# Patient Record
Sex: FEMALE | Race: WHITE | Employment: FULL TIME | URBAN - METROPOLITAN AREA
[De-identification: names, ages, dates, MRNs, and addresses within clinical notes are randomized per-mention and may not be internally consistent; named-entity substitution may affect disease eponyms.]

---

## 2020-05-11 ENCOUNTER — VIRTUAL VISIT (OUTPATIENT)
Dept: FAMILY MEDICINE CLINIC | Age: 25
End: 2020-05-11

## 2020-05-11 DIAGNOSIS — Z3A.22 22 WEEKS GESTATION OF PREGNANCY: Primary | ICD-10-CM

## 2020-05-11 RX ORDER — ASPIRIN 81 MG/1
TABLET ORAL DAILY
COMMUNITY
End: 2021-09-07

## 2020-05-11 RX ORDER — ASCORBIC ACID 500 MG
TABLET ORAL DAILY
COMMUNITY
End: 2021-09-07

## 2020-05-11 NOTE — PATIENT INSTRUCTIONS
Weeks 22 to 26 of Your Pregnancy: Care Instructions Your Care Instructions As you enter your 7th month of pregnancy at week 26, your baby's lungs are growing stronger and getting ready to breathe. You may notice that your baby responds to the sound of your or your partner's voice. You may also notice that your baby does less turning and twisting and more squirming or jerking. Jerking often means that your baby has the hiccups. Hiccups are perfectly normal and are only temporary. You may want to think about attending a childbirth preparation class. This is also a good time to start thinking about whether you want to have pain medicine during labor. Most pregnant women are tested for gestational diabetes between weeks 25 and 28. Gestational diabetes occurs when your blood sugar level gets too high when you're pregnant. The test is important, because you can have gestational diabetes and not know it. But the condition can cause problems for your baby. Follow-up care is a key part of your treatment and safety. Be sure to make and go to all appointments, and call your doctor if you are having problems. It's also a good idea to know your test results and keep a list of the medicines you take. How can you care for yourself at home? Ease discomfort from your baby's kicking · Change your position. Sometimes this will cause your baby to change position too. · Take a deep breath while you raise your arm over your head. Then breathe out while you drop your arm. Do Kegel exercises to prevent urine from leaking · You can do Kegel exercises while you stand or sit. ? Squeeze the same muscles you would use to stop your urine. Your belly and thighs should not move. ? Hold the squeeze for 3 seconds, and then relax for 3 seconds. ? Start with 3 seconds. Then add 1 second each week until you are able to squeeze for 10 seconds. ? Repeat the exercise 10 to 15 times for each session.  Do three or more sessions each day. Ease or reduce swelling in your feet, ankles, hands, and fingers · If your fingers are puffy, take off your rings. · Do not eat high-salt foods, such as potato chips. · Prop up your feet on a stool or couch as much as possible. Sleep with pillows under your feet. · Do not stand for long periods of time or wear tight shoes. · Wear support stockings. Where can you learn more? Go to http://marcial-evgeny.info/ Enter G264 in the search box to learn more about \"Weeks 22 to 26 of Your Pregnancy: Care Instructions. \" Current as of: May 29, 2019Content Version: 12.4 © 4635-6046 [employer]. Care instructions adapted under license by [employer] (which disclaims liability or warranty for this information). If you have questions about a medical condition or this instruction, always ask your healthcare professional. [address] any warranty or liability for your use of this information.

## 2020-05-11 NOTE — PROGRESS NOTES
Alisa Mendieta is a 25 y.o. female who was evaluated by an audio-video encounter for concerns as above. Patient identification was verified prior to start of the visit. A caregiver was present when appropriate. Due to this being a TeleHealth encounter (During [facility] public health emergency), evaluation of the following organ systems was limited: Vitals/Constitutional/EENT/Resp/CV/GI//MS/Neuro/Skin/Heme-Lymph-Imm. Pursuant to the emergency declaration under the [facility], [facility] waiver authority and the [employer] Act, this Virtual Visit was conducted, with patient's (and/or legal guardian's) consent, to reduce the patient's risk of exposure to COVID-19 and provide necessary medical care. Services were provided through a synchronous discussion virtually to substitute for in-person clinic visit. I was at home. The patient was at home. SUBJECTIVE  Chief Complaint   Patient presents with    Other     establish care, 22 weeks pregnant     Patient presents for a referral to ob. She just moved back to the area from [place]. [facility] advised her that they do not have any capacity to see her for her pregnancy. She is 22 weeks pregnant with her first child. She has had prenatal care to date. She is on bASA due to risk of pre-eclampsia she says. She is on a PNV and vit C. She has no complaints. Past Medical History:   Diagnosis Date    Sleep apnea     improved after tonsils out     Past Surgical History:   Procedure Laterality Date    HX GYN  2009    cyst removed with partial fallopian tube.  HX HEENT      ear tubes    HX OTHER SURGICAL      FRENULECTOMY    HX TONSIL AND ADENOIDECTOMY  2016       Current Outpatient Medications:     ascorbic acid, vitamin C, (Vitamin C) 500 mg tablet, Take  by mouth daily. , Disp: , Rfl:     prenatal vit,calc76/iron/folic (PNV 09-6 PO), Take  by mouth daily. , Disp: , Rfl:    aspirin delayed-release 81 mg tablet, Take  by mouth daily. , Disp: , Rfl:   No Known Allergies    Social History     Tobacco Use    Smoking status: Never Smoker    Smokeless tobacco: Never Used   Substance Use Topics    Alcohol use: No    Drug use: No     Family History   Problem Relation Age of Onset    Hypertension Mother     Cancer Mother         skin    Cancer Paternal Aunt         lung cancer    Cancer Maternal Grandmother         skin cancer    Hypertension Maternal Grandmother     Heart Disease Maternal Grandfather     Cancer Paternal Grandmother         breast cancer    Cancer Paternal Grandfather         prostate cancer    Hypertension Father     Diabetes Father      ROS:  Complete 10 system ROS is obtained from the patient which is negative. OBJECTIVE    General:  Alert, cooperative, well appearing, in no apparent distress. Psych: normal affect. Mood good. Oriented x 3. Judgement and insight intact. ASSESSMENT / PLAN    ICD-10-CM ICD-9-CM    1. 22 weeks gestation of pregnancy Z3A.22 V22.2 REFERRAL TO OBSTETRICS AND GYNECOLOGY     Refer to OB. Cont current care. Cont PNV. Advised to provide a vaccine record for our review. 22 minutes of virtual face to face time spent with the patient. All chart history elements were reviewed by me at the time of the visit even though marked at time of note closure. Patient understands our medical plan. Patient has provided input and agrees with goals. Alternatives have been explained and offered. All questions answered. The patient is to call if condition worsens or fails to improve. RTC as needed.

## 2020-08-06 ENCOUNTER — OFFICE VISIT (OUTPATIENT)
Dept: FAMILY MEDICINE CLINIC | Facility: CLINIC | Age: 25
End: 2020-08-06

## 2020-08-06 ENCOUNTER — NURSE TRIAGE (OUTPATIENT)
Dept: OTHER | Facility: CLINIC | Age: 25
End: 2020-08-06

## 2020-08-06 ENCOUNTER — TELEPHONE (OUTPATIENT)
Dept: FAMILY MEDICINE CLINIC | Age: 25
End: 2020-08-06

## 2020-08-06 VITALS
TEMPERATURE: 99 F | OXYGEN SATURATION: 97 % | SYSTOLIC BLOOD PRESSURE: 121 MMHG | RESPIRATION RATE: 14 BRPM | DIASTOLIC BLOOD PRESSURE: 73 MMHG | HEART RATE: 87 BPM

## 2020-08-06 DIAGNOSIS — R05.9 COUGH: Primary | ICD-10-CM

## 2020-08-06 DIAGNOSIS — R06.2 EXPIRATORY WHEEZING: ICD-10-CM

## 2020-08-06 RX ORDER — ALBUTEROL SULFATE 90 UG/1
1 AEROSOL, METERED RESPIRATORY (INHALATION)
Qty: 1 INHALER | Refills: 0 | Status: SHIPPED | OUTPATIENT
Start: 2020-08-06 | End: 2021-09-07

## 2020-08-06 RX ORDER — LANOLIN ALCOHOL/MO/W.PET/CERES
CREAM (GRAM) TOPICAL
COMMUNITY
End: 2020-09-20

## 2020-08-06 NOTE — PROGRESS NOTES
Chief Complaint   Patient presents with    Cough     chest tightness     Other     Exposed to COVID at work       SUBJECTIVE:    The patient presents to our specialty flu / COVID-19 clinic with a focused complaint of the following: Mild dry cough. Patient denies fever, shortness of breath, fatigue, GI/ symptoms. Patient denies recent travel. Pt exposed to sick contacts under investigations for possible Covid YES. Patient works at [employer]. She states that she was exposed to [employer] while at work. Pt is not a current smoker. OBJECTIVE    Visit Vitals  /73   Pulse 87   Temp 99 °F (37.2 °C) (Oral)   Resp 14   SpO2 97%      General:  well nourished, cooperative, pleasant and in no apparent distress. Sick but not toxic appearing. Patient currently is 8 months pregnant. Eyes:   The lids are without swelling, lesions, or drainage. The conjunctiva is clear and noninjected. ENT:  ENT exam normal, no neck nodes or sinus tenderness and throat normal without erythema or exudate. Neck: normal.  Lungs/CV: Noted mild wheeze to bilateral lower lobes, no coughing during visit noted, unlabored breathing. Heart: regular rhythm normal rate. Skin:  No rashes, no jaundice. ASSESSMENT / PLAN     ICD-10-CM ICD-9-CM    1. Cough  R05 786.2 AMB POC RAPID STREP A      NOVEL CORONAVIRUS (COVID-19)      albuterol (PROVENTIL HFA, VENTOLIN HFA, PROAIR HFA) 90 mcg/actuation inhaler   2. Expiratory wheezing  R06.2 786.07 albuterol (PROVENTIL HFA, VENTOLIN HFA, PROAIR HFA) 90 mcg/actuation inhaler       negative for strep. Based on CDC recommendations and limited testing supplies, only those patients who meet criteria will be tested for Covid.      High priority groups for testing   Symptomatic and/or Exposure /Test for Covid  Immunocompromised host (on prednisone, biological therapy, blood cancer, metastatic cancer or active chemotherapy)   [facility] worker in the home  Other high-risk group: o age >47   o Uncontrolled DM   o Uncontrolled HTN   o BMI >40, CKD/ESRD    Dialysis patients (patients going to HD units, not asymptomatic home HD/PD)    Anyone living in a congregate setting     Non High-risk patient category           Test for COVID-19   Asymptomatic, no known exposure  No    Asymptomatic, possible exposure  No    Asymptomatic, definite exposure  Provider discretion    Symptomatic, no known exposure  Yes    Symptomatic, + exposure  Yes      Patient does  meet criteria for Covid testing. COVID-19 testing was completed. Work note was given until [employer] are available. If Covid testing was completed and is negative, patient may return back to work despite quarantine originally set in place if applicable. Awaiting Covid 19 results at this time. Instructed pt on the importance of rest, fluid intake (with avoidance of red fluids). Instructed patient to remember to wash hands, disinfect surroundings, and avoid touching face. Instructed pt to remain home and and self quarantine until Covid results are negative and all symptoms have improved or subsided. If they must leave home, wear a mask. Patient verbalized understanding. We have provided the patient with a detailed after visit summary which was reviewed, and red flag symptoms that would warrant an ER visit were emphasized. Spoke with Dr. Rakesh Shelton, patient's PCP, in reference to the physical findings of wheezing and fever. With patient being 8 months gestation, consulted with patient's PCP for appropriate therapy. We both agree on albuterol inhaler as needed. As far as the patient's fever, she is to contact [employer] and discuss what treatment she can do over-the-counter for her temperature. Also instructed patient to discuss with her OB the use of the albuterol while pregnant before obtaining and starting this treatment.   Patient is to notify her OB she person under investigation Covid.      Dr. Zoë Hernandez, [facility], DNP      This visit was provided as a focused evaluation during the COVID -19 pandemic/national emergency. A comprehensive review of all previous patient history and testing was not conducted. Pertinent findings were elicited during the visit.

## 2020-08-06 NOTE — TELEPHONE ENCOUNTER
Patient had a covid exposure and is now having a cough for 2 days. Does not feel short of breath. She is pregnant. She has an OB. GYN. I think there is benefit from her having a lung exam and pulse ox check along with a covid test mainly due to her pregnancy. She is referred to our flu clinic. I will have them call her to schedule her to be seen.

## 2020-08-06 NOTE — TELEPHONE ENCOUNTER
Reason for Disposition   [1] Symptoms of COVID-19 (e.g., cough, fever, SOB, or others) AND [2] within 14 days of EXPOSURE (close contact) with diagnosed or suspected COVID-19 patient   HIGH RISK patient (e.g., age > 59 years, diabetes, heart or lung disease, weak immune system)    Answer Assessment - Initial Assessment Questions  1. CLOSE CONTACT: \"Who is the person with the confirmed or suspected COVID-19 infection that you were exposed to? \"      Co-worker  2. PLACE of CONTACT: \"Where were you when you were exposed to COVID-19? \" (e.g., home, school, medical waiting room; which city?)      At work  3. TYPE of CONTACT: \"How much contact was there? \" (e.g., sitting next to, live in same house, work in same office, same building)      Same breakroom  4. DURATION of CONTACT: \"How long were you in contact with the COVID-19 patient? \" (e.g., a few seconds, passed by person, a few minutes, live with the patient)      2 minutes  5. DATE of CONTACT: \"When did you have contact with a COVID-19 patient? \" (e.g., how many days ago)      Last week  6. TRAVEL: \"Have you traveled out of the country recently? \" If so, \"When and where? \"      * Also ask about out-of-state travel, since the [facility] has identified some high-risk cities for community spread in the [address] [place] [address],[address]. * Note: Travel becomes less relevant if there is widespread community transmission where the patient lives. *No Answer*  7. COMMUNITY SPREAD: \"Are there lots of cases of COVID-19 (community spread) where you live? \" (See public health department website, if unsure)        *No Answer*  8. SYMPTOMS: \"Do you have any symptoms? \" (e.g., fever, cough, breathing difficulty)      Intermittent cough that is \"deep\" and congestion that started 2 nights ago  9. PREGNANCY OR POSTPARTUM: \"Is there any chance you are pregnant? \" \"When was your last menstrual period? \" \"Did you deliver in the last 2 weeks? \"     She is 34 weeks pregnant  10.  HIGH RISK: \"Do you have any heart or lung problems? Do you have a weak immune system? \" (e.g., CHF, COPD, asthma, HIV positive, chemotherapy, renal failure, diabetes mellitus, sickle cell anemia)        no    Protocols used: CORONAVIRUS (COVID-19) EXPOSURE-ADULT-AH, CORONAVIRUS (COVID-19) DIAGNOSED OR SUSPECTED-ADULT-AH    Pod 9    Pt with symptoms as documented above. Pt informed of disposition. Pt educated on  flu clinic. Pt educated on precautions/hand hygiene/ social distancing. Care advice as documented. Please do not respond to the triage nurse through this encounter. Any subsequent communication should be directly with the patient.

## 2020-08-06 NOTE — PROGRESS NOTES
Alison Cornea presents today for   Chief Complaint   Patient presents with    Cough     chest tightness  Exposed       Is someone accompanying this pt? no    Is the patient using any DME equipment during OV? no    Travel and Exposure Screening was performed during check in or rooming process Yes  No      Depression Screening:  3 most recent PHQ Screens 8/6/2020   Little interest or pleasure in doing things Not at all   Feeling down, depressed, irritable, or hopeless Not at all   Total Score PHQ 2 0       Fall Risk  No flowsheet data found.     This Visit Test  Results for orders placed or performed during the hospital encounter of 05/03/16   POC HCG,URINE   Result Value Ref Range    HCG urine, QL negative NEGATIVE,Negative,negative

## 2020-08-06 NOTE — TELEPHONE ENCOUNTER
Appointment made for patient to see:    Doctor: Jose Garcia NP    Address/telephone #: [address] [facility] [phone]    Appointment date/time: 8/6/2020 @ 11:20 am    Patient notified of appointment information.

## 2020-08-06 NOTE — LETTER
NOTIFICATION RETURN TO WORK / SCHOOL 
 
8/6/2020 11:36 AM 
 
Ms. Radha [employer] [address] [facility] [address] To Whom It May Concern: 
 
Radha M Bhargav is currently under the care of Sher[facility]. Patient is under quarantine until 8/14/2020. If there are questions or concerns please have the patient contact our office. Sincerely, Ryann Mendez, DNP

## 2020-08-07 LAB
S PYO AG THROAT QL: NEGATIVE
SARS-COV-2, NAA: NOT DETECTED
VALID INTERNAL CONTROL?: YES

## 2020-08-13 ENCOUNTER — TELEPHONE (OUTPATIENT)
Dept: FAMILY MEDICINE CLINIC | Facility: CLINIC | Age: 25
End: 2020-08-13

## 2020-08-13 NOTE — TELEPHONE ENCOUNTER
Called patient and informed her that we have not yet received her test results and we would call her once we receive them. Patient verbalized understanding.

## 2020-08-17 ENCOUNTER — TELEPHONE (OUTPATIENT)
Dept: FAMILY MEDICINE CLINIC | Facility: CLINIC | Age: 25
End: 2020-08-17

## 2020-08-17 NOTE — TELEPHONE ENCOUNTER
Contacted [facility]. Covid 19 test results requested. Received test result via fax. Patient identifiers verified. Patient advised of negative Covid 19 testing. She reports that she currently has no symptoms. The cough and wheeze has gone away and she feels \"fine\".

## 2020-08-17 NOTE — TELEPHONE ENCOUNTER
Pt called back and states that she still has not received her results from the Covid test done on 8/6/2020 and she is pregnant and starting to worry. Didn't see results the in the system. Please advise.

## 2020-09-17 PROBLEM — Z34.90 PREGNANCY: Status: ACTIVE | Noted: 2020-09-17

## 2021-01-22 DIAGNOSIS — R05.9 COUGH: ICD-10-CM

## 2021-04-28 ENCOUNTER — TELEPHONE (OUTPATIENT)
Dept: FAMILY MEDICINE CLINIC | Age: 26
End: 2021-04-28

## 2021-04-28 NOTE — TELEPHONE ENCOUNTER
Nurse to advise, if she is acutely short of breath, then I advise she sees an urgent care for an evaluation. If her shortness of breath is not present at this time, she can monitor and be given the option for a visit with us. Also would want to know which brand vaccine she had.

## 2021-04-28 NOTE — TELEPHONE ENCOUNTER
Patient identifiers verified. Patient advised that if she is having SOB at this time then she should go to urgent care for an evaluation. Patient states she will go to urgent care. 4/28/2021 appointment that was scheduled by the  staff will be cancelled. Patient had Pfizer vaccine.

## 2021-04-28 NOTE — TELEPHONE ENCOUNTER
Pt called stating she had her first pfizer vaccine on 4/26/2021. Pt states on Tuesday 4/27/2021 she started feeling short of breath, per pt she feels like a brick is sitting on her chest. Pt is not in distress at the moment. Pt states she took her blood pressure this morning and it was 122/86 and her resting heart rate was 82. Please advise.

## 2021-09-07 ENCOUNTER — OFFICE VISIT (OUTPATIENT)
Dept: ORTHOPEDIC SURGERY | Age: 26
End: 2021-09-07
Payer: COMMERCIAL

## 2021-09-07 VITALS
HEIGHT: 66 IN | HEART RATE: 77 BPM | TEMPERATURE: 97.5 F | BODY MASS INDEX: 36.64 KG/M2 | WEIGHT: 228 LBS | OXYGEN SATURATION: 98 %

## 2021-09-07 DIAGNOSIS — M54.50 ACUTE MIDLINE LOW BACK PAIN WITHOUT SCIATICA: ICD-10-CM

## 2021-09-07 DIAGNOSIS — M54.50 ACUTE MIDLINE LOW BACK PAIN WITHOUT SCIATICA: Primary | ICD-10-CM

## 2021-09-07 PROCEDURE — 99203 OFFICE O/P NEW LOW 30 MIN: CPT | Performed by: PHYSICAL MEDICINE & REHABILITATION

## 2021-09-07 RX ORDER — METAXALONE 800 MG/1
800 TABLET ORAL
Qty: 30 TABLET | Refills: 0 | Status: SHIPPED
Start: 2021-09-07 | End: 2021-09-07 | Stop reason: SDUPTHER

## 2021-09-07 RX ORDER — METAXALONE 800 MG/1
800 TABLET ORAL
Qty: 30 TABLET | Refills: 0 | Status: SHIPPED | OUTPATIENT
Start: 2021-09-07 | End: 2021-10-07

## 2021-09-07 RX ORDER — ACETAMINOPHEN AND CODEINE PHOSPHATE 120; 12 MG/5ML; MG/5ML
1 SOLUTION ORAL DAILY
COMMUNITY

## 2021-09-07 RX ORDER — MELOXICAM 15 MG/1
15 TABLET ORAL DAILY
Qty: 15 TABLET | Refills: 0 | Status: SHIPPED | OUTPATIENT
Start: 2021-09-07 | End: 2021-09-15 | Stop reason: ALTCHOICE

## 2021-09-07 RX ORDER — MELOXICAM 15 MG/1
15 TABLET ORAL DAILY
Qty: 15 TABLET | Refills: 0 | Status: SHIPPED
Start: 2021-09-07 | End: 2021-09-07 | Stop reason: SDUPTHER

## 2021-09-07 NOTE — PATIENT INSTRUCTIONS
Low Back Pain: Exercises  Introduction  Here are some examples of exercises for you to try. The exercises may be suggested for a condition or for rehabilitation. Start each exercise slowly. Ease off the exercises if you start to have pain. You will be told when to start these exercises and which ones will work best for you. How to do the exercises  Press-up   1. Lie on your stomach, supporting your body with your forearms. 2. Press your elbows down into the floor to raise your upper back. As you do this, relax your stomach muscles and allow your back to arch without using your back muscles. As your press up, do not let your hips or pelvis come off the floor. 3. Hold for 15 to 30 seconds, then relax. 4. Repeat 2 to 4 times. Alternate arm and leg (bird dog) exercise   Do this exercise slowly. Try to keep your body straight at all times, and do not let one hip drop lower than the other. 1. Start on the floor, on your hands and knees. 2. Tighten your belly muscles. 3. Raise one leg off the floor, and hold it straight out behind you. Be careful not to let your hip drop down, because that will twist your trunk. 4. Hold for about 6 seconds, then lower your leg and switch to the other leg. 5. Repeat 8 to 12 times on each leg. 6. Over time, work up to holding for 10 to 30 seconds each time. 7. If you feel stable and secure with your leg raised, try raising the opposite arm straight out in front of you at the same time. Knee-to-chest exercise   1. Lie on your back with your knees bent and your feet flat on the floor. 2. Bring one knee to your chest, keeping the other foot flat on the floor (or keeping the other leg straight, whichever feels better on your lower back). 3. Keep your lower back pressed to the floor. Hold for at least 15 to 30 seconds. 4. Relax, and lower the knee to the starting position. 5. Repeat with the other leg. Repeat 2 to 4 times with each leg.   6. To get more stretch, put your other leg flat on the floor while pulling your knee to your chest.    Curl-ups   1. Lie on the floor on your back with your knees bent at a 90-degree angle. Your feet should be flat on the floor, about 12 inches from your buttocks. 2. Cross your arms over your chest. If this bothers your neck, try putting your hands behind your neck (not your head), with your elbows spread apart. 3. Slowly tighten your belly muscles and raise your shoulder blades off the floor. 4. Keep your head in line with your body, and do not press your chin to your chest.  5. Hold this position for 1 or 2 seconds, then slowly lower yourself back down to the floor. 6. Repeat 8 to 12 times. Pelvic tilt exercise   1. Lie on your back with your knees bent. 2. \"Brace\" your stomach. This means to tighten your muscles by pulling in and imagining your belly button moving toward your spine. You should feel like your back is pressing to the floor and your hips and pelvis are rocking back. 3. Hold for about 6 seconds while you breathe smoothly. 4. Repeat 8 to 12 times. Heel dig bridging   1. Lie on your back with both knees bent and your ankles bent so that only your heels are digging into the floor. Your knees should be bent about 90 degrees. 2. Then push your heels into the floor, squeeze your buttocks, and lift your hips off the floor until your shoulders, hips, and knees are all in a straight line. 3. Hold for about 6 seconds as you continue to breathe normally, and then slowly lower your hips back down to the floor and rest for up to 10 seconds. 4. Do 8 to 12 repetitions. Hamstring stretch in doorway   1. Lie on your back in a doorway, with one leg through the open door. 2. Slide your leg up the wall to straighten your knee. You should feel a gentle stretch down the back of your leg. 3. Hold the stretch for at least 15 to 30 seconds. Do not arch your back, point your toes, or bend either knee.  Keep one heel touching the floor and the other heel touching the wall. 4. Repeat with your other leg. 5. Do 2 to 4 times for each leg. Hip flexor stretch   1. Kneel on the floor with one knee bent and one leg behind you. Place your forward knee over your foot. Keep your other knee touching the floor. 2. Slowly push your hips forward until you feel a stretch in the upper thigh of your rear leg. 3. Hold the stretch for at least 15 to 30 seconds. Repeat with your other leg. 4. Do 2 to 4 times on each side. Wall sit   1. Stand with your back 10 to 12 inches away from a wall. 2. Lean into the wall until your back is flat against it. 3. Slowly slide down until your knees are slightly bent, pressing your lower back into the wall. 4. Hold for about 6 seconds, then slide back up the wall. 5. Repeat 8 to 12 times. Follow-up care is a key part of your treatment and safety. Be sure to make and go to all appointments, and call your doctor if you are having problems. It's also a good idea to know your test results and keep a list of the medicines you take. Where can you learn more? Go to http://www.gray.com/  Enter S068 in the search box to learn more about \"Low Back Pain: Exercises. \"  Current as of: November 16, 2020               Content Version: 12.8  © 0481-3726 Healthwise, Incorporated. Care instructions adapted under license by [employer] (which disclaims liability or warranty for this information). If you have questions about a medical condition or this instruction, always ask your healthcare professional. [place] [address] any warranty or liability for your use of this information. Back Stretches: Exercises  Introduction  Here are some examples of exercises for stretching your back. Start each exercise slowly. Ease off the exercise if you start to have pain.   Your doctor or physical therapist will tell you when you can start these exercises and which ones will work best for you.  How to do the exercises  Overhead stretch   1. Stand comfortably with your feet shoulder-width apart. 2. Looking straight ahead, raise both arms over your head and reach toward the ceiling. Do not allow your head to tilt back. 3. Hold for 15 to 30 seconds, then lower your arms to your sides. 4. Repeat 2 to 4 times. Side stretch   1. Stand comfortably with your feet shoulder-width apart. 2. Raise one arm over your head, and then lean to the other side. 3. Slide your hand down your leg as you let the weight of your arm gently stretch your side muscles. Hold for 15 to 30 seconds. 4. Repeat 2 to 4 times on each side. Press-up   1. Lie on your stomach, supporting your body with your forearms. 2. Press your elbows down into the floor to raise your upper back. As you do this, relax your stomach muscles and allow your back to arch without using your back muscles. As your press up, do not let your hips or pelvis come off the floor. 3. Hold for 15 to 30 seconds, then relax. 4. Repeat 2 to 4 times. Relax and rest   1. Lie on your back with a rolled towel under your neck and a pillow under your knees. Extend your arms comfortably to your sides. 2. Relax and breathe normally. 3. Remain in this position for about 10 minutes. 4. If you can, do this 2 or 3 times each day. Follow-up care is a key part of your treatment and safety. Be sure to make and go to all appointments, and call your doctor if you are having problems. It's also a good idea to know your test results and keep a list of the medicines you take. Where can you learn more? Go to http://www.arias.com/  Enter Y090 in the search box to learn more about \"Back Stretches: Exercises. \"  Current as of: November 16, 2020               Content Version: 12.8  © 9858-3889 Healthwise, Incorporated. Care instructions adapted under license by [employer] (which disclaims liability or warranty for this information). If you have questions about a medical condition or this instruction, always ask your healthcare professional. [address] any warranty or liability for your use of this information. Acute Low Back Pain: Exercises  Introduction  Here are some examples of typical rehabilitation exercises for your condition. Start each exercise slowly. Ease off the exercise if you start to have pain. Your doctor or physical therapist will tell you when you can start these exercises and which ones will work best for you. When you are not being active, find a comfortable position for rest. Some people are comfortable on the floor or a medium-firm bed with a small pillow under their head and another under their knees. Some people prefer to lie on their side with a pillow between their knees. Don't stay in one position for too long. Take short walks (10 to 20 minutes) every 2 to 3 hours. Avoid slopes, hills, and stairs until you feel better. Walk only distances you can manage without pain, especially leg pain. How to do the exercises  Back stretches   1. Get down on your hands and knees on the floor. 2. Relax your head and allow it to droop. Round your back up toward the ceiling until you feel a nice stretch in your upper, middle, and lower back. Hold this stretch for as long as it feels comfortable, or about 15 to 30 seconds. 3. Return to the starting position with a flat back while you are on your hands and knees. 4. Let your back sway by pressing your stomach toward the floor. Lift your buttocks toward the ceiling. 5. Hold this position for 15 to 30 seconds. 6. Repeat 2 to 4 times. Follow-up care is a key part of your treatment and safety. Be sure to make and go to all appointments, and call your doctor if you are having problems. It's also a good idea to know your test results and keep a list of the medicines you take. Where can you learn more?   Go to http://www.gray.com/  Enter S659 in the search box to learn more about \"Acute Low Back Pain: Exercises. \"  Current as of: November 16, 2020               Content Version: 12.8  © 2006-2021 Healthwise, [facility]. Care instructions adapted under license by [employer] (which disclaims liability or warranty for this information). If you have questions about a medical condition or this instruction, always ask your healthcare professional. [place] [address] any warranty or liability for your use of this information.
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
## 2021-09-15 ENCOUNTER — TELEPHONE (OUTPATIENT)
Dept: ORTHOPEDIC SURGERY | Age: 26
End: 2021-09-15

## 2021-09-15 DIAGNOSIS — M54.50 ACUTE MIDLINE LOW BACK PAIN WITHOUT SCIATICA: Primary | ICD-10-CM

## 2021-09-15 RX ORDER — DICLOFENAC SODIUM 50 MG/1
50 TABLET, DELAYED RELEASE ORAL 2 TIMES DAILY WITH MEALS
Qty: 60 TABLET | Refills: 0 | Status: SHIPPED | OUTPATIENT
Start: 2021-09-15 | End: 2021-10-15

## 2021-09-15 NOTE — TELEPHONE ENCOUNTER
Called Radha Durant to review normal x-ray findings. Pain is persistent worse in the am and with bending. Will try diclofenac instead of meloxicam and have her start PT.  If no relief will get MRI

## 2021-09-22 ENCOUNTER — HOSPITAL ENCOUNTER (OUTPATIENT)
Dept: PHYSICAL THERAPY | Age: 26
Discharge: HOME | End: 2021-09-22
Attending: PHYSICAL MEDICINE & REHABILITATION
Payer: COMMERCIAL

## 2021-09-22 PROCEDURE — 97161 PT EVAL LOW COMPLEX 20 MIN: CPT

## 2021-09-22 PROCEDURE — 97140 MANUAL THERAPY 1/> REGIONS: CPT

## 2021-09-22 PROCEDURE — 97535 SELF CARE MNGMENT TRAINING: CPT

## 2021-09-22 PROCEDURE — 97110 THERAPEUTIC EXERCISES: CPT
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
## 2021-10-01 ENCOUNTER — HOSPITAL ENCOUNTER (OUTPATIENT)
Dept: PHYSICAL THERAPY | Age: 26
Discharge: HOME | End: 2021-10-01
Attending: PHYSICAL MEDICINE & REHABILITATION
Payer: COMMERCIAL

## 2021-10-01 PROCEDURE — 97530 THERAPEUTIC ACTIVITIES: CPT

## 2021-10-01 PROCEDURE — 97112 NEUROMUSCULAR REEDUCATION: CPT

## 2021-10-01 PROCEDURE — 97110 THERAPEUTIC EXERCISES: CPT

## 2021-10-01 NOTE — PROGRESS NOTES
PT DAILY TREATMENT NOTE     Patient Name: Clemencia Callahan  Date:10/1/2021  : 1995  [x]  Patient  Verified  Payor: Phil Human / Plan: [place] [employer] / Product Type: HMO /    In time:3:00 PM  Out time:3:45 PM  Total Treatment Time (min): 45  Visit #: 2 of 4-8      Treatment Area: Acute midline low back pain without sciatica [M54.50]    SUBJECTIVE  Pain Level (0-10 scale): 1  Any medication changes, allergies to medications, adverse drug reactions, diagnosis change, or new procedure performed?: [x] No    [] Yes (see summary sheet for update)  Subjective functional status/changes:   [] No changes reported  Reports significant improvement in pain since performing HEP daily    OBJECTIVE    10 min Therapeutic Exercise:  [x] See flow sheet :   Rationale: increase ROM and increase strength to improve the patients ability to perform ADLs. 15 min Therapeutic Activity:  [x]  See flow sheet : [place]/anti-rotation, step ups with TA setting    Rationale: increase strength, improve coordination and increase proprioception  to improve the patients ability to complete functional mobility with improved mechanics and reduced pain. 20 min Neuromuscular Re-education:  [x]  See flow sheet : supine pelvic tilt activities, trapeze, quadruped   Rationale: increase strength and improve coordination  to improve the patients ability to maintain lumbopelvic stability for greater efficiency in household chores.      With   [] TE   [] TA   [] neuro   [] other: Patient Education: [] Review HEP    [] Progressed/Changed HEP based on:   [] positioning   [] body mechanics   [] transfers   [] heat/ice application    [] other:      Other Objective/Functional Measures: Exercise program initiated per flowsheet     Pain Level (0-10 scale) post treatment: 3    ASSESSMENT/Changes in Function: Patient with excellent understanding of lumbopelvic mechanics in supine and quadruped activities with good carryover of technique into upright activities. More discomfort upon completion of session, described as \"soreness\"    Patient will continue to benefit from skilled PT services to modify and progress therapeutic interventions, address functional mobility deficits, address ROM deficits, address strength deficits, analyze and address soft tissue restrictions, analyze and cue movement patterns and analyze and modify body mechanics/ergonomics to attain remaining goals. Progress towards goals / Updated goals:  Short Term Goals: To be accomplished in 2 weeks:  1. Pt will report compliance with her HEP to maximize therapeutic success. Eval: HEP assigned   Current: 10/1/2021 - MET - patient has been performing daily since initial evaluation and with improvement in symptoms  2. Pt will demonstrate correct performance of a posterior pelvic tilt with isometric hold to improve abdominal activation for ADL tasks. Eval: pt education provided   Current: 10/1/2021 - MET - demonstrates appropriate technique without cues; able to perform marching, deadbug, and cat-cow exercises    Long Term Goals: To be accomplished in 4 weeks:  1. Pt will improve lumbar flexion to WNL without pain to improve ease of managing self care. Eval: 75% with pain  2. Pt will improve her BLE strength to MMT 4+/5 without pain to maximize ease of managing ADLs and childcare. Eval:grossly 4/5 hip flexors, quads, HS, right glute med; 4-/5 left glute med and glute max, 4/5 right glute med, 3+/5 right glute max  3. Pt will lift and carry a 30 pound kettlebell without increase in pain to assist her with childcare. Eval: lifting her daughter increases pain  4. Pt will demonstrate 10 reciprocal bird dog repetitions with pelvic neutral, indicating improved abdominal stability for ADL management. Eval: NT  5. Pt will improve her FOTO score to 80, demonstrating improved functional capacity for ADLs.               Eval: 62    PLAN  [x]  Upgrade activities as tolerated     []  Continue plan of care  []  Update interventions per flow sheet       []  Discharge due to:_  []  Other:_      Kris Fernandez, PT 10/1/2021  3:04 PM    Future Appointments   Date Time Provider Nicky Patel   10/6/2021  3:45 PM Tino Flores PTA [facility]   10/13/2021  3:45 PM Darrell Gandara PT [facility]

## 2021-10-13 ENCOUNTER — HOSPITAL ENCOUNTER (OUTPATIENT)
Dept: PHYSICAL THERAPY | Age: 26
Discharge: HOME | End: 2021-10-13
Attending: PHYSICAL MEDICINE & REHABILITATION
Payer: COMMERCIAL

## 2021-10-13 PROCEDURE — 97530 THERAPEUTIC ACTIVITIES: CPT

## 2021-10-13 PROCEDURE — 97110 THERAPEUTIC EXERCISES: CPT

## 2021-10-13 PROCEDURE — 97112 NEUROMUSCULAR REEDUCATION: CPT

## 2021-10-13 NOTE — PROGRESS NOTES
PT DAILY TREATMENT NOTE     Patient Name: Debra Gonsalez  Date:10/13/2021  : 1995  [x]  Patient  Verified  Payor: Raleigh Goodson / Plan: [employer] / Product Type: HMO /    In time:347PM  Out time:430pm  Total Treatment Time (min): 42  Visit #: 3 of 4    Treatment Area: Acute midline low back pain without sciatica [M54.50]    SUBJECTIVE  Pain Level (0-10 scale): 4  Any medication changes, allergies to medications, adverse drug reactions, diagnosis change, or new procedure performed?: [x] No    [] Yes (see summary sheet for update)  Subjective functional status/changes:   [] No changes reported  Pt reports she is sore today from using a baby carrying sling but otherwise has been doing better overall. Wants to d/c after her last scheduled visit on 10/14/2021. OBJECTIVE    8 min Therapeutic Exercise:  [x] See flow sheet :   Rationale: increase ROM and increase strength to improve the patients ability to perform ADLs. 10 min Therapeutic Activity:  [x]  See flow sheet : [place]/anti-rotation, step ups with TA setting    Rationale: increase strength, improve coordination and increase proprioception  to improve the patients ability to complete functional mobility with improved mechanics and reduced pain. 24 min Neuromuscular Re-education:  [x]  See flow sheet : supine pelvic tilt activities, trapeze, quadruped   Rationale: increase strength and improve coordination  to improve the patients ability to maintain lumbopelvic stability for greater efficiency in household chores.            With   [] TE   [] TA   [] neuro   [] other: Patient Education: [x] Review HEP    [] Progressed/Changed HEP based on:   [] positioning   [] body mechanics   [] transfers   [] heat/ice application    [] other:      Other Objective/Functional Measures: exercises per flowsheet     Pain Level (0-10 scale) post treatment: 5 \"sore\"    ASSESSMENT/Changes in Function: Pt continues to get increased pain with core exercises and mobility activities, but in general she expresses that she feels decreased pain and good relief with HEP stretches. She would like to d/c at her next visit. Discussed HEP with progressions with a stability ball if she chooses to purchase one to further challenge core strengthening. Patient will continue to benefit from skilled PT services to modify and progress therapeutic interventions, address functional mobility deficits, address ROM deficits, address strength deficits, analyze and address soft tissue restrictions, analyze and cue movement patterns, analyze and modify body mechanics/ergonomics, assess and modify postural abnormalities, address imbalance/dizziness and instruct in home and community integration to attain remaining goals. []  See Plan of Care  []  See progress note/recertification  []  See Discharge Summary         Progress towards goals / Updated goals:  Short Term Goals: To be accomplished in 2 weeks:  1. Pt will report compliance with her HEP to maximize therapeutic success.              Eval: HEP assigned              Current: 10/1/2021 - MET - patient has been performing daily since initial evaluation and with improvement in symptoms  2. Pt will demonstrate correct performance of a posterior pelvic tilt with isometric hold to improve abdominal activation for ADL tasks.              Eval: pt education provided              Current: 10/1/2021 - MET - demonstrates appropriate technique without cues; able to perform marching, deadbug, and cat-cow exercises   Long Term Goals: To be accomplished in 4 weeks:  1. Pt will improve lumbar flexion to WNL without pain to improve ease of managing self care.               Eval: 75% with pain   Current: met (10/13/2021)  2.  Pt will improve her BLE strength to MMT 4+/5 without pain to maximize ease of managing ADLs and childcare.              Eval:grossly 4/5 hip flexors, quads, HS, right glute med; 4-/5 left glute med and glute max, 4/5 right glute med, 3+/5 right glute max   3. Pt will lift and carry a 30 pound kettlebell without increase in pain to assist her with childcare.              Eval: lifting her daughter increases pain  4. Pt will demonstrate 10 reciprocal bird dog repetitions with pelvic neutral, indicating improved abdominal stability for ADL management.              Eval: NT   Current: partially met, performs first 5-8 repetitions with good pelvic neutral, as fatigues will shift weight laterally. (10/13/2021)  5. Pt will improve her FOTO score to 80, demonstrating improved functional capacity for ADLs.               Eval: 62    PLAN  []  Upgrade activities as tolerated     [x]  Continue plan of care  []  Update interventions per flow sheet       []  Discharge due to:_  []  Other:_      Silvano Jaimes, PT 10/13/2021  3:54 PM    Future Appointments   Date Time Provider Nicky Patel   10/14/2021  2:30 PM Eliana Cai PTA [facility]

## 2021-10-14 ENCOUNTER — HOSPITAL ENCOUNTER (OUTPATIENT)
Dept: PHYSICAL THERAPY | Age: 26
Discharge: HOME | End: 2021-10-14
Attending: PHYSICAL MEDICINE & REHABILITATION
Payer: COMMERCIAL

## 2021-10-14 PROCEDURE — 97110 THERAPEUTIC EXERCISES: CPT

## 2021-10-14 PROCEDURE — 97112 NEUROMUSCULAR REEDUCATION: CPT

## 2021-10-14 NOTE — PROGRESS NOTES
[facility]  [address]  [place], [address]  [phone] fax    Physical Therapy Discharge Summary  Patient name: El Lambert Start of Care:    Referral source: Donne Patches* : 1995   Medical/Treatment Diagnosis: Acute midline low back pain without sciatica [M54.50]  Payor: Stanislav Lung / Plan: [employer] / Product Type: HMO /  Onset Date:2021     Prior Hospitalization: see medical history Provider#: [id number]   Medications: Verified on Patient Summary List    Comorbidities: BMI > 30, \"prior surgery  Prior Level of Function:no previous hx of LBP  Visits from Start of Care: 4    Missed Visits: 1  Reporting Period : 2021 to 10/14/2021      Summary of Care:  Goal:Pt will report compliance with her HEP to maximize therapeutic success. Status at last note/certification: HEP assigned  Status at discharge: met    Goal:Pt will demonstrate correct performance of a posterior pelvic tilt with isometric hold to improve abdominal activation for ADL tasks. Status at last note/certification: pt education provided  Status at discharge: met    Goal:Pt will improve lumbar flexion to WNL without pain to improve ease of managing self care.   Status at last note/certification:75% with pain  Status at discharge: met    Goal:Pt will improve her BLE strength to MMT 4+/5 without pain to maximize ease of managing ADLs and childcare. Status at last note/certification:grossly 4/5 hip flexors, quads, HS, right glute med; 4-/5 left glute med and glute max, 4/5 right glute med, 3+/5 right glute max   Status at discharge: met    Goal:Pt will lift and carry a 30 pound kettlebell without increase in pain to assist her with childcare.   Status at last note/certification:lifting her daughter increases pain  Status at discharge: met    Goal:Pt will demonstrate 10 reciprocal bird dog repetitions with pelvic neutral, indicating improved abdominal stability for ADL management. Status at last note/certification: NT  Status at discharge: met    Goal:Pt will improve her FOTO score to 80, demonstrating improved functional capacity for ADLs. Status at last note/certification:57  Status at discharge: met      ASSESSMENT/RECOMMENDATIONS: Patient has made good progress in Physical Therapy towards improved LE and core strength, improved ease with caring for daughter, and improved activity tolerance. Patient is compliant with HEP and demo's good understanding of technique. Patient if prepared for D/C from PT at this time as she has met all goals.      Thank you for this referral!  [x]Discontinue therapy: [x]Patient has reached or is progressing toward set goals      []Patient is non-compliant or has abdicated      []Due to lack of appreciable progress towards set goals    Ben Sykes, PTA 10/14/2021 2:35 PM  Jose Alberto Fernandez PT, DPT  10/14/2021  3:22 PM

## 2021-10-14 NOTE — PROGRESS NOTES
PT DAILY TREATMENT NOTE     Patient Name: El Lambert  Date:10/14/2021  : 1995  [x]  Patient  Verified  Payor: Stanislav Lung / Plan: [employer] / Product Type: HMO /    In time:2:31  Out time:3:04  Total Treatment Time (min): 33  Visit #: 4 of 4    Treatment Area: Acute midline low back pain without sciatica [M54.50]    SUBJECTIVE  Pain Level (0-10 scale): 0  Any medication changes, allergies to medications, adverse drug reactions, diagnosis change, or new procedure performed?: [x] No    [] Yes (see summary sheet for update)  Subjective functional status/changes:   [] No changes reported  Pt reports feeling much better, no longer has back pain unless she walks >1.5 miles while carrying her daughter    OBJECTIVE    23 min Therapeutic Exercise:  [x] See flow sheet :   Rationale: increase ROM and increase strength to improve the patients ability to perform ADLs     10 min Neuromuscular Re-education:  [x]  See flow sheet :   Rationale: increase strength and improve coordination  to improve the patients ability to perform functional tasks           With   [] TE   [] TA   [] neuro   [] other: Patient Education: [x] Review HEP    [] Progressed/Changed HEP based on:   [] positioning   [] body mechanics   [] transfers   [] heat/ice application    [] other:      Other Objective/Functional Measures:   FOTO 94   MMT francy LE 4+/5 pain free    Pain Level (0-10 scale) post treatment: 0    ASSESSMENT/Changes in Function: Patient has made good progress in Physical Therapy towards improved LE and core strength, improved ease with caring for daughter, and improved activity tolerance. Patient is compliant with HEP and demo's good understanding of technique. Patient if prepared for D/C from PT at this time as she has met all goals.      Patient will continue to benefit from skilled PT services to modify and progress therapeutic interventions, address functional mobility deficits, address ROM deficits, address strength deficits, analyze and address soft tissue restrictions, analyze and cue movement patterns, analyze and modify body mechanics/ergonomics and assess and modify postural abnormalities to attain remaining goals. []  See Plan of Care  []  See progress note/recertification  []  See Discharge Summary         Progress towards goals / Updated goals:  Short Term Goals: To be accomplished in 2 weeks:  1. Pt will report compliance with her HEP to maximize therapeutic success.              Eval: HEP assigned              Current: 10/1/2021 - MET - patient has been performing daily since initial evaluation and with improvement in symptoms  2. Pt will demonstrate correct performance of a posterior pelvic tilt with isometric hold to improve abdominal activation for ADL tasks.              Eval: pt education provided              Current: 10/1/2021 - MET - demonstrates appropriate technique without cues; able to perform marching, deadbug, and cat-cow exercises   Long Term Goals: To be accomplished in 4 weeks:  1. Pt will improve lumbar flexion to WNL without pain to improve ease of managing self care.               Eval: 75% with pain              Current: met (10/13/2021)  2. Pt will improve her BLE strength to MMT 4+/5 without pain to maximize ease of managing ADLs and childcare.              Eval:grossly 4/5 hip flexors, quads, HS, right glute med; 4-/5 left glute med and glute max, 4/5 right glute med, 3+/5 right glute max    Current: MET BLE MMT 4+/5 grossly 10/14/2021  3. Pt will lift and carry a 30 pound kettlebell without increase in pain to assist her with childcare.              Eval: lifting her daughter increases pain   Current: Met, no difficulty 10/14/2021  4. Pt will demonstrate 10 reciprocal bird dog repetitions with pelvic neutral, indicating improved abdominal stability for ADL management.              Eval: NT              Current:Met 10/14/2021  5.  Pt will improve her FOTO score to 80, demonstrating improved functional capacity for ADLs.               Eval: 62   Current: Met       PLAN  []  Upgrade activities as tolerated     []  Continue plan of care  []  Update interventions per flow sheet       [x]  Discharge due to:_goals met  []  Other:_      Mendel Maduro Winter, PTA 10/14/2021  2:26 PM    Future Appointments   Date Time Provider Nicky Patel   10/14/2021  2:30 PM Misha Aviles PTA [facility]

## 2021-10-14 NOTE — PROGRESS NOTES
Physical Therapy Discharge Instructions      [facility]  [address]  [place], [address]  [phone] fax    Patient: Clemencia Callahan  : 1995      Continue Home Exercise Program 3 times per day for 4 weeks, then decrease to 4-5 times per week      Continue with    [x] Ice  as needed     [x] Heat           Follow up with MD:     [x] Upon completion of therapy     [] As needed      Recommendations:     [x]   Return to activity with home program    []   Return to activity with the following modifications:       []Post Rehab Program    []Join Independent aquatic program     []Return to/join local gym        Additional Comments: Keep up the good work!!          George Sykes, CH[place] 10/14/2021 2:45 PM

## 2022-03-19 PROBLEM — Z34.90 PREGNANCY: Status: ACTIVE | Noted: 2020-09-17

## 2022-07-18 ENCOUNTER — VIRTUAL VISIT (OUTPATIENT)
Dept: FAMILY MEDICINE CLINIC | Age: 27
End: 2022-07-18
Payer: COMMERCIAL

## 2022-07-18 DIAGNOSIS — U07.1 COVID-19: Primary | ICD-10-CM

## 2022-07-18 DIAGNOSIS — R11.2 NAUSEA AND VOMITING, UNSPECIFIED VOMITING TYPE: ICD-10-CM

## 2022-07-18 PROCEDURE — 99214 OFFICE O/P EST MOD 30 MIN: CPT | Performed by: FAMILY MEDICINE

## 2022-07-18 RX ORDER — ONDANSETRON 4 MG/1
4 TABLET, ORALLY DISINTEGRATING ORAL
Qty: 12 TABLET | Refills: 0 | Status: SHIPPED | OUTPATIENT
Start: 2022-07-18

## 2022-07-18 NOTE — PROGRESS NOTES
Radha RENE Brooke, who was evaluated through a synchronous (real-time) audio-video encounter, and/or her healthcare decision maker, is aware that it is a billable service, which includes applicable co-pays, with coverage as determined by her insurance carrier. She provided verbal consent to proceed and patient identification was verified. This visit was conducted pursuant to the emergency declaration under the [address] [place] [address] and the [employer] and I[employer] General Act. A caregiver was present when appropriate. Ability to conduct physical exam was limited. The patient was located at: Home: [place] [id number]  [id number] [place] [address]  The provider was located at: Home:     --Alexandra Gautam MD on 7/18/2022 at 11:04 AM    SUBJECTIVE    The patient presents complaining of a 2 day history of feeling sinus / head tightness that worsened with nausea and not being able to keep food down. Tolerating liquids. She has had a cough productive of brown mucus that cleared with time. nasal congestion and drainage. The patient reports sinus pressure or headaches. The patient feeling feverish yesterday but it got better. The patient denies abd pain. Has had mild diarrhea. The patient denies chest pain, chest tightness, or shortness of breath. The patient has tried taking Dayquil, Nyquil, acetaminophen, and vitamin C, D, and zinc.     OBJECTIVE    General:  Alert, cooperative, well appearing, in no apparent distress. Lungs: Inspiratory and expiratory efforts are full and unlabored. ASSESSMENT / PLAN    ICD-10-CM ICD-9-CM    1. COVID-19  U07.1 079.89    2. Nausea and vomiting, unspecified vomiting type  R11.2 787.01 ondansetron (ZOFRAN ODT) 4 mg disintegrating tablet     The patient was encouraged to drink plenty of fluids. The patient was counseled on the proper usage and appropriate uses for common OTC medications.   Zofran 4mg ODT q8h as needed for N/V. Monitor symptoms to resolution. Quarantine for at least 5 days. The patient was instructed to follow-up if their condition worsened or persisted. All chart history elements were reviewed by me at the time of the visit even though marked at time of note closure. Patient understands our medical plan. Patient has provided input and agrees with goals. Alternatives have been explained and offered. All questions answered. The patient is to call if condition worsens or fails to improve. Follow-up and Dispositions    · Return if symptoms worsen or fail to improve.

## 2022-07-18 NOTE — LETTER
7/18/2022 11:09 AM    Ms. Rena Rodriguez [id number]    To Whom It May Concern,    Please excuse Radha from work starting 7/18/2022 through 7/22/2022. She has been diagnosed with Covid-19 and will be cleared from her quarantine and ready for work July 25th as long as she recovers as expected.                 Sincerely,      Halle Joe MD

## 2022-07-18 NOTE — PATIENT INSTRUCTIONS
A Healthy Lifestyle: Care Instructions  Your Care Instructions     A healthy lifestyle can help you feel good, stay at a healthy weight, and have plenty of energy for both work and play. A healthy lifestyle is something you can share with your whole family. A healthy lifestyle also can lower your risk for serious health problems, such as high blood pressure, heart disease, and diabetes. You can follow a few steps listed below to improve your health and the health of your family. Follow-up care is a key part of your treatment and safety. Be sure to make and go to all appointments, and call your doctor if you are having problems. It's also a good idea to know your test results and keep a list of the medicines you take. How can you care for yourself at home? · Do not eat too much sugar, fat, or fast foods. You can still have dessert and treats now and then. The goal is moderation. · Start small to improve your eating habits. Pay attention to portion sizes, drink less juice and soda pop, and eat more fruits and vegetables. ? Eat a healthy amount of food. A 3-ounce serving of meat, for example, is about the size of a deck of cards. Fill the rest of your plate with vegetables and whole grains. ? Limit the amount of soda and sports drinks you have every day. Drink more water when you are thirsty. ? Eat plenty of fruits and vegetables every day. Have an apple or some carrot sticks as an afternoon snack instead of a candy bar. Try to have fruits and/or vegetables at every meal.  · Make exercise part of your daily routine. You may want to start with simple activities, such as walking, bicycling, or slow swimming. Try to be active 30 to 60 minutes every day. You do not need to do all 30 to 60 minutes all at once. For example, you can exercise 3 times a day for 10 or 20 minutes.  Moderate exercise is safe for most people, but it is always a good idea to talk to your doctor before starting an exercise program.  · Keep moving. [place] the lawn, work in the garden, or [employer]. Take the stairs instead of the elevator at work. · If you smoke, quit. People who smoke have an increased risk for heart attack, stroke, cancer, and other lung illnesses. Quitting is hard, but there are ways to boost your chance of quitting tobacco for good. ? Use nicotine gum, patches, or lozenges. ? Ask your doctor about stop-smoking programs and medicines. ? Keep trying. In addition to reducing your risk of diseases in the future, you will notice some benefits soon after you stop using tobacco. If you have shortness of breath or asthma symptoms, they will likely get better within a few weeks after you quit. · Limit how much alcohol you drink. Moderate amounts of alcohol (up to 2 drinks a day for men, 1 drink a day for women) are okay. But drinking too much can lead to liver problems, high blood pressure, and other health problems. Family health  If you have a family, there are many things you can do together to improve your health. · Eat meals together as a family as often as possible. · Eat healthy foods. This includes fruits, vegetables, lean meats and dairy, and whole grains. · Include your family in your fitness plan. Most people think of activities such as jogging or tennis as the way to fitness, but there are many ways you and your family can be more active. Anything that makes you breathe hard and gets your heart pumping is exercise. Here are some tips:  ? Walk to do errands or to take your child to school or the bus.  ? Go for a family bike ride after dinner instead of watching TV. Where can you learn more? Go to http://www.gray.com/  Enter N188 in the search box to learn more about \"A Healthy Lifestyle: Care Instructions. \"  Current as of: June 16, 2021               Content Version: 13.2  © 5807-1791 [employer].    Care instructions adapted under license by [employer] (which disclaims liability or warranty for this information). If you have questions about a medical condition or this instruction, always ask your healthcare professional. [address] any warranty or liability for your use of this information.

## 2024-06-26 SDOH — ECONOMIC STABILITY: FOOD INSECURITY: WITHIN THE PAST 12 MONTHS, THE FOOD YOU BOUGHT JUST DIDN'T LAST AND YOU DIDN'T HAVE MONEY TO GET MORE.: PATIENT DECLINED

## 2024-06-26 SDOH — ECONOMIC STABILITY: FOOD INSECURITY: WITHIN THE PAST 12 MONTHS, YOU WORRIED THAT YOUR FOOD WOULD RUN OUT BEFORE YOU GOT MONEY TO BUY MORE.: PATIENT DECLINED

## 2024-06-26 SDOH — ECONOMIC STABILITY: INCOME INSECURITY: HOW HARD IS IT FOR YOU TO PAY FOR THE VERY BASICS LIKE FOOD, HOUSING, MEDICAL CARE, AND HEATING?: PATIENT DECLINED

## 2024-06-26 SDOH — ECONOMIC STABILITY: HOUSING INSECURITY
IN THE LAST 12 MONTHS, WAS THERE A TIME WHEN YOU DID NOT HAVE A STEADY PLACE TO SLEEP OR SLEPT IN A SHELTER (INCLUDING NOW)?: PATIENT DECLINED

## 2024-06-26 SDOH — ECONOMIC STABILITY: TRANSPORTATION INSECURITY
IN THE PAST 12 MONTHS, HAS LACK OF TRANSPORTATION KEPT YOU FROM MEETINGS, WORK, OR FROM GETTING THINGS NEEDED FOR DAILY LIVING?: PATIENT DECLINED

## 2024-06-27 NOTE — PROGRESS NOTES
\"Have you been to the ER, urgent care clinic since your last visit?  Hospitalized since your last visit?\"    {YES/NO:454319761}    “Have you seen or consulted any other health care providers outside of [facility] since your last visit?”    {YES/NO:168961042}     “Have you had a pap smear?”    {YES/NO:394085074}    Date of last Cervical Cancer screen (HPV or PAP): 6/14/2017 - No Pap smear [facility] on [facility] as on 06/27/2024            Click Here for Release of Records Request

## 2024-06-27 NOTE — PATIENT INSTRUCTIONS
Well Visit, Ages 18 to 65: Care Instructions  Well visits can help you stay healthy. Your doctor has checked your overall health and may have suggested ways to take good care of yourself. Your doctor also may have recommended tests. You can help prevent illness with healthy eating, good sleep, vaccinations, regular exercise, and other steps.  Get the tests that you and your doctor decide on. Depending on your age and risks, examples might include screening for diabetes; hepatitis C; HIV; and cervical, breast, lung, and colon cancer. Screening helps find diseases before any symptoms appear.   Eat healthy foods. Choose fruits, vegetables, whole grains, lean protein, and low-fat dairy foods. Limit saturated fat and reduce salt.     Limit alcohol. Men should have no more than 2 drinks a day. Women should have no more than 1. For some people, no alcohol is the best choice.   Exercise. Get at least 30 minutes of exercise on most days of the week. Walking can be a good choice.     Reach and stay at your healthy weight. This will lower your risk for many health problems.   Take care of your mental health. Try to stay connected with friends, family, and community, and find ways to manage stress.     If you're feeling depressed or hopeless, talk to someone. A counselor can help. If you don't have a counselor, talk to your doctor.   Talk to your doctor if you think you may have a problem with alcohol or drug use. This includes prescription medicines and illegal drugs.     Avoid tobacco and nicotine: Don't smoke, vape, or chew. If you need help quitting, talk to your doctor.   Practice safer sex. Getting tested, using condoms or dental dams, and limiting sex partners can help prevent STIs.     Use birth control if it's important to you to prevent pregnancy. Talk with your doctor about your choices and what might be best for you.   Prevent problems where you can. Protect your skin from too much sun, wash your hands, brush

## 2024-07-01 ENCOUNTER — OFFICE VISIT (OUTPATIENT)
Facility: CLINIC | Age: 29
End: 2024-07-01
Payer: COMMERCIAL

## 2024-07-01 VITALS
DIASTOLIC BLOOD PRESSURE: 80 MMHG | BODY MASS INDEX: 36.74 KG/M2 | TEMPERATURE: 98.5 F | HEART RATE: 69 BPM | HEIGHT: 66 IN | RESPIRATION RATE: 16 BRPM | SYSTOLIC BLOOD PRESSURE: 110 MMHG | WEIGHT: 228.6 LBS | OXYGEN SATURATION: 98 %

## 2024-07-01 DIAGNOSIS — Z00.00 WELL ADULT EXAM: Primary | ICD-10-CM

## 2024-07-01 DIAGNOSIS — E55.9 VITAMIN D DEFICIENCY: ICD-10-CM

## 2024-07-01 PROCEDURE — 99395 PREV VISIT EST AGE 18-39: CPT | Performed by: FAMILY MEDICINE

## 2024-07-01 SDOH — ECONOMIC STABILITY: INCOME INSECURITY: HOW HARD IS IT FOR YOU TO PAY FOR THE VERY BASICS LIKE FOOD, HOUSING, MEDICAL CARE, AND HEATING?: NOT HARD AT ALL

## 2024-07-01 SDOH — ECONOMIC STABILITY: HOUSING INSECURITY
IN THE LAST 12 MONTHS, WAS THERE A TIME WHEN YOU DID NOT HAVE A STEADY PLACE TO SLEEP OR SLEPT IN A SHELTER (INCLUDING NOW)?: NO

## 2024-07-01 SDOH — ECONOMIC STABILITY: FOOD INSECURITY: WITHIN THE PAST 12 MONTHS, YOU WORRIED THAT YOUR FOOD WOULD RUN OUT BEFORE YOU GOT MONEY TO BUY MORE.: NEVER TRUE

## 2024-07-01 SDOH — ECONOMIC STABILITY: FOOD INSECURITY: WITHIN THE PAST 12 MONTHS, THE FOOD YOU BOUGHT JUST DIDN'T LAST AND YOU DIDN'T HAVE MONEY TO GET MORE.: NEVER TRUE

## 2024-07-01 ASSESSMENT — PATIENT HEALTH QUESTIONNAIRE - PHQ9
SUM OF ALL RESPONSES TO PHQ QUESTIONS 1-9: 0
2. FEELING DOWN, DEPRESSED OR HOPELESS: NOT AT ALL
1. LITTLE INTEREST OR PLEASURE IN DOING THINGS: NOT AT ALL
SUM OF ALL RESPONSES TO PHQ QUESTIONS 1-9: 0
SUM OF ALL RESPONSES TO PHQ9 QUESTIONS 1 & 2: 0

## 2024-07-01 ASSESSMENT — ANXIETY QUESTIONNAIRES
7. FEELING AFRAID AS IF SOMETHING AWFUL MIGHT HAPPEN: NOT AT ALL
2. NOT BEING ABLE TO STOP OR CONTROL WORRYING: NOT AT ALL
6. BECOMING EASILY ANNOYED OR IRRITABLE: NOT AT ALL
3. WORRYING TOO MUCH ABOUT DIFFERENT THINGS: NOT AT ALL
5. BEING SO RESTLESS THAT IT IS HARD TO SIT STILL: NOT AT ALL
1. FEELING NERVOUS, ANXIOUS, OR ON EDGE: NOT AT ALL
IF YOU CHECKED OFF ANY PROBLEMS ON THIS QUESTIONNAIRE, HOW DIFFICULT HAVE THESE PROBLEMS MADE IT FOR YOU TO DO YOUR WORK, TAKE CARE OF THINGS AT HOME, OR GET ALONG WITH OTHER PEOPLE: NOT DIFFICULT AT ALL
4. TROUBLE RELAXING: NOT AT ALL
GAD7 TOTAL SCORE: 0

## 2024-07-01 NOTE — PROGRESS NOTES
Well Adult Note  Name: Purvi Carson Today’s Date: 2024   MRN: [id number] Sex: Female   Age: 28 y.o. Ethnicity: Non- / Non    : 1995 Race: White (non-)      Purvi Carson is here for well adult exam.  History:  No new complaints.  Has had a recent  6 weeks ago.  She is breast feeding and on a PNV with Fe.      Review of Systems - neg    No Known Allergies      Prior to Visit Medications    Medication Sig Taking? Authorizing Provider   Prenatal Vit-Fe Fumarate-FA (PRENATAL PO) Take by mouth Yes ProviderAnisha MD   Probiotic Product (PROBIOTIC PO) Take by mouth Yes Provider, MD Anisha         Past Medical History:   Diagnosis Date    Sleep apnea     improved after tonsils out       Past Surgical History:   Procedure Laterality Date    GYN  2009    cyst removed with partial fallopian tube.    HEENT      ear tubes    OTHER SURGICAL HISTORY      FRENULECTOMY    OVARY REMOVAL      TONSILLECTOMY AND ADENOIDECTOMY  2016         Family History   Problem Relation Age of Onset    Heart Disease Maternal Grandfather     Hypertension Maternal Grandmother     Cancer Maternal Grandmother         skin cancer    Cancer Paternal Aunt         lung cancer    Cancer Mother         skin    Hypertension Mother     Cancer Paternal Grandmother         breast cancer    Diabetes Father     Hypertension Father     Cancer Paternal Grandfather         prostate cancer       Social History     Tobacco Use    Smoking status: Never    Smokeless tobacco: Never   Substance Use Topics    Alcohol use: Not Currently    Drug use: No       Objective     Vital Signs  /80 (Site: Left Upper Arm, Position: Sitting, Cuff Size: Large Adult)   Pulse 69   Temp 98.5 °F (36.9 °C) (Tympanic)   Resp 16   Ht 1.676 m (5' 6\")   Wt 103.7 kg (228 lb 9.6 oz)   LMP 2024   SpO2 98%   BMI 36.90 kg/m²   Wt Readings from Last 3 Encounters:   24 103.7 kg (228 lb 9.6 oz)   21 103.4 kg (228 lb)

## 2024-07-01 NOTE — PROGRESS NOTES
\"Have you been to the ER, urgent care clinic since your last visit?  Hospitalized since your last visit?\"    YES - When: approximately 2 months ago.  Where and Why: delivery .    “Have you seen or consulted any other health care providers outside of [facility] since your last visit?”    YES - When: approximately 2 months ago.  Where and Why: [facility] .     “Have you had a pap smear?”    YES - Where: [facility]  Nurse/CMA to request most recent records if not in the chart    Date of last Cervical Cancer screen (HPV or PAP): 6/14/2017 - No Pap smear noted on [facility] as on 06/27/2024            Click Here for Release of Records Request

## 2025-02-12 SDOH — HEALTH STABILITY: PHYSICAL HEALTH: ON AVERAGE, HOW MANY MINUTES DO YOU ENGAGE IN EXERCISE AT THIS LEVEL?: 30 MIN

## 2025-02-12 SDOH — HEALTH STABILITY: PHYSICAL HEALTH: ON AVERAGE, HOW MANY DAYS PER WEEK DO YOU ENGAGE IN MODERATE TO STRENUOUS EXERCISE (LIKE A BRISK WALK)?: 6 DAYS

## 2025-02-13 ENCOUNTER — HOSPITAL ENCOUNTER (OUTPATIENT)
Facility: HOSPITAL | Age: 30
Discharge: HOME | End: 2025-02-16

## 2025-02-13 ENCOUNTER — OFFICE VISIT (OUTPATIENT)
Age: 30
End: 2025-02-13
Payer: OTHER GOVERNMENT

## 2025-02-13 VITALS — BODY MASS INDEX: 36.8 KG/M2 | WEIGHT: 228 LBS

## 2025-02-13 DIAGNOSIS — M99.05 PELVIC REGION SOMATIC DYSFUNCTION: ICD-10-CM

## 2025-02-13 DIAGNOSIS — M99.07 UPPER EXTREMITY SOMATIC DYSFUNCTION: ICD-10-CM

## 2025-02-13 DIAGNOSIS — M99.02 THORACIC REGION SOMATIC DYSFUNCTION: ICD-10-CM

## 2025-02-13 DIAGNOSIS — M99.06 LOWER LIMB REGION SOMATIC DYSFUNCTION: ICD-10-CM

## 2025-02-13 DIAGNOSIS — M99.09 SOMATIC DYSFUNCTION OF ABDOMINAL REGION: ICD-10-CM

## 2025-02-13 DIAGNOSIS — M99.03 SOMATIC DYSFUNCTION OF LUMBAR REGION: ICD-10-CM

## 2025-02-13 DIAGNOSIS — M99.08 RIB CAGE REGION SOMATIC DYSFUNCTION: ICD-10-CM

## 2025-02-13 DIAGNOSIS — M22.2X1 PATELLOFEMORAL DISORDER, RIGHT: Primary | ICD-10-CM

## 2025-02-13 DIAGNOSIS — M99.04 SACRAL REGION SOMATIC DYSFUNCTION: ICD-10-CM

## 2025-02-13 DIAGNOSIS — M22.2X1 PATELLOFEMORAL DISORDER, RIGHT: ICD-10-CM

## 2025-02-13 DIAGNOSIS — M99.01 CERVICAL SOMATIC DYSFUNCTION: ICD-10-CM

## 2025-02-13 PROCEDURE — 98929 OSTEOPATH MANJ 9-10 REGIONS: CPT | Performed by: FAMILY MEDICINE

## 2025-02-13 PROCEDURE — 99204 OFFICE O/P NEW MOD 45 MIN: CPT | Performed by: FAMILY MEDICINE

## 2025-02-13 NOTE — PATIENT INSTRUCTIONS
Try ankle weight (start 1 lb.) or use heavy boot/shoe or a resistance bad tied to chair leg at ankle of injured knee and extend knee as far as possible and hold 1 second. Work up to 3 sets of 15 reps 4+ times/week.

## 2025-02-13 NOTE — PROGRESS NOTES
HISTORY OF PRESENT ILLNESS    Purvi Carson 1995 is a 29 y.o. year old female comes in today as new patient for: right knee pain    Patients symptoms have been present for 2 weeks.  Pain level 6/10 anterior. It has worsened with walk, stairs.  Patient has tried:  ice, ibuprofen without benefit.  It is described as pain as above without injury.    IMAGING: XR right knee pending    Past Surgical History:   Procedure Laterality Date    GYN  2009    cyst removed with partial fallopian tube.    HEENT      ear tubes    OTHER SURGICAL HISTORY      FRENULECTOMY    OVARY REMOVAL      TONSILLECTOMY AND ADENOIDECTOMY  2016     Social History     Socioeconomic History    Marital status:      Spouse name: None    Number of children: 2    Years of education: None    Highest education level: None   Occupational History    Occupation: teacher   Tobacco Use    Smoking status: Never    Smokeless tobacco: Never   Substance and Sexual Activity    Alcohol use: Not Currently    Drug use: No    Sexual activity: Yes     Partners: Male     Birth control/protection: None     Social Determinants of Health     Financial Resource Strain: Low Risk  (7/1/2024)    Overall Financial Resource Strain (CARDIA)     Difficulty of Paying Living Expenses: Not hard at all   Food Insecurity: No Food Insecurity (7/1/2024)    Hunger Vital Sign     Worried About Running Out of Food in the Last Year: Never true     Ran Out of Food in the Last Year: Never true   Transportation Needs: Unknown (7/1/2024)    PRAPARE - Transportation     Lack of Transportation (Non-Medical): No   Physical Activity: Sufficiently Active (2/12/2025)    Exercise Vital Sign     Days of Exercise per Week: 6 days     Minutes of Exercise per Session: 30 min   Housing Stability: Unknown (7/1/2024)    Housing Stability Vital Sign     Unstable Housing in the Last Year: No      Current Outpatient Medications   Medication Sig Dispense Refill    Prenatal Vit-Fe Fumarate-FA (PRENATAL